# Patient Record
Sex: FEMALE | Race: WHITE | Employment: OTHER | RURAL
[De-identification: names, ages, dates, MRNs, and addresses within clinical notes are randomized per-mention and may not be internally consistent; named-entity substitution may affect disease eponyms.]

---

## 2017-05-10 RX ORDER — AZELASTINE 1 MG/ML
1 SPRAY, METERED NASAL 2 TIMES DAILY
Qty: 1 BOTTLE | Refills: 3 | Status: SHIPPED | OUTPATIENT
Start: 2017-05-10 | End: 2018-11-12 | Stop reason: CLARIF

## 2017-08-25 ENCOUNTER — TELEPHONE (OUTPATIENT)
Dept: FAMILY MEDICINE CLINIC | Age: 72
End: 2017-08-25

## 2017-11-28 ENCOUNTER — TELEPHONE (OUTPATIENT)
Dept: FAMILY MEDICINE CLINIC | Age: 72
End: 2017-11-28

## 2017-11-30 ENCOUNTER — TELEPHONE (OUTPATIENT)
Dept: FAMILY MEDICINE CLINIC | Age: 72
End: 2017-11-30

## 2017-12-28 ENCOUNTER — OFFICE VISIT (OUTPATIENT)
Dept: FAMILY MEDICINE CLINIC | Age: 72
End: 2017-12-28

## 2017-12-28 VITALS
BODY MASS INDEX: 51.24 KG/M2 | HEART RATE: 109 BPM | RESPIRATION RATE: 14 BRPM | WEIGHT: 261 LBS | TEMPERATURE: 98.1 F | DIASTOLIC BLOOD PRESSURE: 76 MMHG | OXYGEN SATURATION: 97 % | SYSTOLIC BLOOD PRESSURE: 124 MMHG | HEIGHT: 60 IN

## 2017-12-28 DIAGNOSIS — M35.3 POLYMYALGIA RHEUMATICA (HCC): Primary | ICD-10-CM

## 2017-12-28 DIAGNOSIS — I10 ESSENTIAL HYPERTENSION: ICD-10-CM

## 2017-12-28 DIAGNOSIS — Z00.00 MEDICARE ANNUAL WELLNESS VISIT, SUBSEQUENT: ICD-10-CM

## 2017-12-28 RX ORDER — LISINOPRIL 10 MG/1
10 TABLET ORAL DAILY
Qty: 90 TAB | Refills: 3 | Status: SHIPPED | OUTPATIENT
Start: 2017-12-28 | End: 2019-01-14 | Stop reason: SDUPTHER

## 2017-12-28 NOTE — MR AVS SNAPSHOT
Visit Information Date & Time Provider Department Dept. Phone Encounter #  
 12/28/2017 11:30 AM Elvira Barney MD [facility] [id number] Follow-up Instructions Return in about 1 year (around 12/28/2018). Follow-up and Disposition History Upcoming Health Maintenance Date Due Hepatitis C Screening 1945 FOBT Q 1 YEAR AGE 50-75 2/15/1995 Pneumococcal 65+ Low/Medium Risk (2 of 2 - PPSV23) 2/15/2010 MEDICARE YEARLY EXAM 11/24/2017 GLAUCOMA SCREENING Q2Y 7/6/2018 DTaP/Tdap/Td series (2 - Td) 12/28/2027 Allergies as of 12/28/2017  Review Complete On: 12/28/2017 By: Elvira Barney MD  
  
 Severity Noted Reaction Type Reactions Latex  11/05/2015    Contact Dermatitis Sulfa (Sulfonamide Antibiotics) High 11/20/2015    Anaphylaxis Duloxetine  11/23/2016   Intolerance Other (comments) Paraben  11/05/2015    Rash Soy  04/13/2016    Other (comments) Current Immunizations  Never Reviewed No immunizations on file. Not reviewed this visit You Were Diagnosed With   
  
 Codes Comments Polymyalgia rheumatica (HCC)    -  Primary ICD-10-CM: M35.3 ICD-9-CM: 476 Essential hypertension     ICD-10-CM: I10 
ICD-9-CM: 401.9 Medicare annual wellness visit, subsequent     ICD-10-CM: Z00.00 ICD-9-CM: V70.0 BMI 50.0-59.9, adult [facility])     ICD-10-CM: A45.59 
ICD-9-CM: V85.43 Vitals BP Pulse Temp Resp Height(growth percentile) Weight(growth percentile) 124/76 (!) 109 98.1 °F (36.7 °C) (Oral) 14 5' (1.524 m) 261 lb (118.4 kg) SpO2 BMI OB Status Smoking Status 97% 50.97 kg/m2 Postmenopausal Never Smoker BMI and BSA Data Body Mass Index Body Surface Area 50.97 kg/m 2 2.24 m 2 Preferred Pharmacy Pharmacy Name Phone [address] [place], [facility] [place] Paddy Macias [phone] Your Updated Medication List  
  
   
 This list is accurate as of: 12/28/17  1:42 PM.  Always use your most recent med list. ADVIL 200 mg tablet Generic drug:  ibuprofen Take  by mouth. ADVIL -38 mg Tab Generic drug:  Ibuprofen-diphenhydrAMINE Take  by mouth. alendronate 40 mg tablet Commonly known as:  FOSAMAX Take 1 Tab by mouth every seven (7) days. aspirin delayed-release 81 mg tablet Take  by mouth two (2) times a day. azelastine 137 mcg (0.1 %) nasal spray Commonly known as:  ASTELIN  
1 Spray by Both Nostrils route two (2) times a day. Use in each nostril as directed  
  
 cholecalciferol 400 unit Chew chewable tablet Commonly known as:  VITAMIN D3 Take 200 Units by mouth daily. coenzyme Q-10 200 mg capsule Commonly known as:  CO Q-10 Take  by mouth daily. cyanocobalamin (vitamin B-12) 1,000 mcg/mL Drop  
by SubLINGual route. diclofenac EC 50 mg EC tablet Commonly known as:  VOLTAREN Take 50 mg by mouth daily. lisinopril 10 mg tablet Commonly known as:  Aundra Anne Take 1 Tab by mouth daily. OSTEO BI-FLEX PO Take  by mouth. PRESERVISION AREDS Cap capsule Generic drug:  Vit A,C,E-Zinc-Copper Take 1 Cap by mouth. Prescriptions Sent to Pharmacy Refills  
 lisinopril (PRINIVIL, ZESTRIL) 10 mg tablet 3 Sig: Take 1 Tab by mouth daily. Class: Normal  
 Pharmacy: [address] [place], [address] [facility] Ph #: [phone] Route: Oral  
  
We Performed the Following CBC WITH AUTOMATED DIFF [83644 CPT(R)] LIPID PANEL [80288 CPT(R)] METABOLIC PANEL, COMPREHENSIVE [00230 CPT(R)] SED RATE (ESR) [id number] CPT(R)] TSH 3RD GENERATION [30196 CPT(R)] Follow-up Instructions Return in about 1 year (around 12/28/2018). Introducing [facility]!    
 [employer] [facility] introduces [facility] [employer] patient portal. Now you can access parts of your medical record, email your doctor's office, and request medication refills online. 1. In your internet browser, go to [email or web address] [employer] 2. Click on the First Time User? Click Here link in the Sign In box. You will see the New Member Sign Up page. 3. Enter your [employer] Access Code exactly as it appears below. You will not need to use this code after youve completed the sign-up process. If you do not sign up before the expiration date, you must request a new code. · [employer] Access Code: [email or web address] Expires: 3/28/2018 12:03 PM 
 
4. Enter the last four digits of your Social Security Number (xxxx) and Date of Birth (mm/dd/yyyy) as indicated and click Submit. You will be taken to the next sign-up page. 5. Create a [employer] ID. This will be your [employer] login ID and cannot be changed, so think of one that is secure and easy to remember. 6. Create a [employer] password. You can change your password at any time. 7. Enter your Password Reset Question and Answer. This can be used at a later time if you forget your password. 8. Enter your e-mail address. You will receive e-mail notification when new information is available in [address] 9. Click Sign Up. You can now view and download portions of your medical record. 10. Click the Download Summary menu link to download a portable copy of your medical information. If you have questions, please visit the Frequently Asked Questions section of the [employer] website. Remember, [employer] is NOT to be used for urgent needs. For medical emergencies, dial 911. Now available from your iPhone and Android! Please provide this summary of care documentation to your next provider. Your primary care clinician is listed as Vijay Serrato. If you have any questions after today's visit, please call [phone].

## 2017-12-28 NOTE — PROGRESS NOTES
Chief Complaint   Patient presents with    Annual Wellness Visit    Advance Care Planning     need         HPI:       is a 67 y.o. female. Ms Glenda Cardenas suffers from morbid obesity. Her OA is significant and complicated by the presence of PMR (manages with NSAIDs). She has been evaluated by Dr Ang Hatch for joint replacement surgery. She has HTN. Compliant, the patient reports no problems or side effects from current medications. Retired and on Disability as a result of her PMR, she remains independent. Allergies   Allergen Reactions    Latex Contact Dermatitis    Sulfa (Sulfonamide Antibiotics) Anaphylaxis    Duloxetine Other (comments)    Paraben Rash    Soy Other (comments)       Current Outpatient Prescriptions   Medication Sig    Vit A,C,E-Zinc-Copper (PRESERVISION AREDS) cap capsule Take 1 Cap by mouth.  lisinopril (PRINIVIL, ZESTRIL) 10 mg tablet Take 1 Tab by mouth daily.  azelastine (ASTELIN) 137 mcg (0.1 %) nasal spray 1 [place] by Both Nostrils route two (2) times a day. Use in each nostril as directed    diclofenac EC (VOLTAREN) 50 mg EC tablet Take 50 mg by mouth daily.  alendronate (FOSAMAX) 40 mg tablet Take 1 Tab by mouth every seven (7) days.  cholecalciferol (VITAMIN D3) 400 unit chew chewable tablet Take 200 Units by mouth daily.  coenzyme Q-10 (CO Q-10) 200 mg capsule Take  by mouth daily.  cyanocobalamin, vitamin B-12, 1,000 mcg/mL drop by SubLINGual route.  GLUCOSAMINE HCL/CHONDR WALKER A NA (OSTEO BI-FLEX PO) Take  by mouth.  aspirin delayed-release 81 mg tablet Take  by mouth two (2) times a day.  ibuprofen (ADVIL) 200 mg tablet Take  by mouth.  Ibuprofen-diphenhydrAMINE (ADVIL PM) 200-38 mg tab Take  by mouth. No current facility-administered medications for this visit.         Past Medical History:   Diagnosis Date    Asthma     Hyperlipidemia     Hypertension     Morbid obesity ([address]     Pneumonia age 3    again in her 30's    Polymyalgia rheumatica (HCC)     Vitamin D deficiency          ROS:  Denies fever, chills, cough, chest pain, SOB,  nausea, vomiting, or diarrhea. Denies wt loss, wt gain, hemoptysis, hematochezia or melena. Physical Examination:    /76 Comment: large cuff  Pulse (!) 109  Temp 98.1 °F (36.7 °C) (Oral)   Resp 14  Ht 5' (1.524 m)  Wt 261 lb (118.4 kg)  SpO2 97%  BMI 50.97 kg/m2    General: Alert and Ox3, Fluent speech  HEENT:  NC/AT, EOMI, OP: clear  Neck:  Supple, no adenopathy, JVD, mass or bruit  Chest:  Clear to Ausculation, without wheezes, rales, rubs or ronchi  Cardiac: RRR  Abdomen:  +BS, soft, nontender without palpable HSM  Extremities:  No cyanosis, clubbing or edema  Neurologic:  Ambulatory without assist, CN 2-12 grossly intact. Moves all extremities. Skin: no rash  Lymphadenopathy: no cervical or supraclavicular nodes      ASSESSMENT AND PLAN:     1. BMI 50:  Diet and exercise stressed  2. HTN is well controlled  3. SAWV  4. PMR: review labs this week  5. Osteoporosis:  Complete 5 years of Fosamax    Orders Placed This Encounter    SED RATE (ESR)    TSH 3RD GENERATION    LIPID PANEL    METABOLIC PANEL, COMPREHENSIVE    CBC WITH AUTOMATED DIFF    Vit A,C,E-Zinc-Copper (PRESERVISION AREDS) cap capsule     Sig: Take 1 Cap by mouth.  lisinopril (PRINIVIL, ZESTRIL) 10 mg tablet     Sig: Take 1 Tab by mouth daily. Dispense:  90 Tab     Refill:  3       Flavio Dietrich MD, FACP        ______________________________________________________________________    Sarah Rocha is a 67 y.o. female and presents for annual Medicare Wellness Visit. Problem List: Reviewed with patient and discussed risk factors.     Patient Active Problem List   Diagnosis Code    Hyperlipidemia E78.5    Morbid obesity ([address] E66.01    Polymyalgia rheumatica (HCC) M35.3    Hypertension I10    Asthma J45.909    Vitamin D deficiency E55.9    Pneumonia J18.9    Advanced care planning/counseling discussion Z70.80    Medicare annual wellness visit, subsequent Z00.00       Current medical providers:  Patient Care Team:  Hayder Waldron MD as PCP - General (Internal Medicine)    PMH, SH, Medications/Allergies: reviewed, on chart. Female Alcohol Screening: On any occasion during the past 3 months, have you had more than 3 drinks containing alcohol? No    Do you average more than 7 drinks per week? No    ROS:  Constitutional: No fever, chills or weight loss  Respiratory: No cough, SOB   CV: No chest pain or Palpitations    Objective:  Visit Vitals    /76  Comment: large cuff    Pulse (!) 109    Temp 98.1 °F (36.7 °C) (Oral)    Resp 14    Ht 5' (1.524 m)    Wt 261 lb (118.4 kg)    SpO2 97%    BMI 50.97 kg/m2    Body mass index is 50.97 kg/(m^2). Assessment of cognitive impairment: Alert and oriented x 3    Depression Screen:   PHQ over the last two weeks 12/28/2017   Little interest or pleasure in doing things Not at all   Feeling down, depressed or hopeless Not at all   Total Score PHQ 2 0       Fall Risk Assessment:    Fall Risk Assessment, last 12 mths 12/28/2017   Able to walk? Yes   Fall in past 12 months? No       Functional Ability:   Does the patient exhibit a steady gait? yes   How long did it take the patient to get up and walk from a sitting position? 12 sec   Is the patient self reliant?  (ie can do own laundry, meals, household chores)  yes     Does the patient handle his/her own medications? yes     Does the patient handle his/her own money? yes     Is the patients home safe (ie good lighting, handrails on stairs and bath, etc.)? yes     Did you notice or did patient express any hearing difficulties? yes     Did you notice or did patient express any vision difficulties?    no       Advance Care Planning:   Patient was offered the opportunity to discuss advance care planning:  yes     Does patient have an Advance Directive:  yes   If no, did you provide information on Caring Connections?  no       Plan:      Orders Placed This Encounter    SED RATE (ESR)    TSH 3RD GENERATION    LIPID PANEL    METABOLIC PANEL, COMPREHENSIVE    CBC WITH AUTOMATED DIFF    Vit A,C,E-Zinc-Copper (PRESERVISION AREDS) cap capsule    lisinopril (PRINIVIL, ZESTRIL) 10 mg tablet       Health Maintenance   Topic Date Due    Hepatitis C Screening  1945    FOBT Q 1 YEAR AGE 50-75  02/15/1995    Pneumococcal 65+ Low/Medium Risk (2 of 2 - PPSV23) 02/15/2010    MEDICARE YEARLY EXAM  11/24/2017    GLAUCOMA SCREENING Q2Y  07/06/2018    DTaP/Tdap/Td series (2 - Td) 12/28/2027    OSTEOPOROSIS SCREENING (DEXA)  Completed    ZOSTER VACCINE AGE 60>  Addressed    Influenza Age 5 to Adult  Addressed       *Patient verbalized understanding and agreement with the plan. A copy of the After Visit Summary with personalized health plan was given to the patient today.

## 2017-12-28 NOTE — PROGRESS NOTES
Calros Ayoub is a 67 y.o. female and presents for annual Medicare Wellness Visit. Problem List: Reviewed with patient and discussed risk factors. Patient Active Problem List   Diagnosis Code    Hyperlipidemia E78.5    Morbid obesity ([address] E66.01    Polymyalgia rheumatica (HCC) M35.3    Hypertension I10    Asthma J45.909    Vitamin D deficiency E55.9    Pneumonia J18.9    Advanced care planning/counseling discussion Z70.80       Current medical providers:  Patient Care Team:  Lia Ceja MD as PCP - General (Internal Medicine)    PSH: Reviewed with patient  Past Surgical History:   Procedure Laterality Date    HX COLONOSCOPY  2005        SH: Reviewed with patient  Social History   Substance Use Topics    Smoking status: Never Smoker    Smokeless tobacco: Never Used    Alcohol use None       FH: Reviewed with patient  No family history on file. Medications/Allergies: Reviewed with patient  Current Outpatient Prescriptions on File Prior to Visit   Medication Sig Dispense Refill    azelastine (ASTELIN) 137 mcg (0.1 %) nasal spray 1 [place] by Both Nostrils route two (2) times a day. Use in each nostril as directed 1 Bottle 3    diclofenac EC (VOLTAREN) 50 mg EC tablet Take 50 mg by mouth daily.  alendronate (FOSAMAX) 40 mg tablet Take 1 Tab by mouth every seven (7) days. 12 Tab 4    lisinopril (PRINIVIL, ZESTRIL) 10 mg tablet Take 1 Tab by mouth daily. 90 Tab 3    cholecalciferol (VITAMIN D3) 400 unit chew chewable tablet Take 200 Units by mouth daily.  coenzyme Q-10 (CO Q-10) 200 mg capsule Take  by mouth daily.  cyanocobalamin, vitamin B-12, 1,000 mcg/mL drop by SubLINGual route.  GLUCOSAMINE HCL/CHONDR WALKER A NA (OSTEO BI-FLEX PO) Take  by mouth.  aspirin delayed-release 81 mg tablet Take  by mouth two (2) times a day.  ibuprofen (ADVIL) 200 mg tablet Take  by mouth.  Ibuprofen-diphenhydrAMINE (ADVIL PM) 200-38 mg tab Take  by mouth.        No current facility-administered medications on file prior to visit. Allergies   Allergen Reactions    Latex Contact Dermatitis    Sulfa (Sulfonamide Antibiotics) Anaphylaxis    Duloxetine Other (comments)    Paraben Rash    Soy Other (comments)       Objective:  Visit Vitals    /76  Comment: large cuff    Pulse (!) 109    Temp 98.1 °F (36.7 °C) (Oral)    Resp 14    Ht 5' (1.524 m)    Wt 261 lb (118.4 kg)    SpO2 97%    BMI 50.97 kg/m2    Body mass index is 50.97 kg/(m^2). Assessment of cognitive impairment: Alert and oriented x 3    Depression Screen:   PHQ over the last two weeks 12/28/2017   Little interest or pleasure in doing things Not at all   Feeling down, depressed or hopeless Not at all   Total Score PHQ 2 0       Fall Risk Assessment:    Fall Risk Assessment, last 12 mths 12/28/2017   Able to walk? Yes   Fall in past 12 months? No       Functional Ability:   Does the patient exhibit a steady gait? yes   How long did it take the patient to get up and walk from a sitting position? 1   Is the patient self reliant?  (ie can do own laundry, meals, household chores)    yes     Does the patient handle his/her own medications? yes     Does the patient handle his/her own money? yes     Is the patients home safe (ie good lighting, handrails on stairs and bath, etc.)? no     Did you notice or did patient express any hearing difficulties? yes     Did you notice or did patient express any vision difficulties? yes     Were distance and reading eye charts used? no       Advance Care Planning:   Patient was offered the opportunity to discuss advance care planning:  yes     Does patient have an Advance Directive:  yes   If no, did you provide information on Caring Connections?   yes       Plan:      Orders Placed This Encounter    Vit A,C,E-Zinc-Copper (PRESERVISION AREDS) cap capsule       Health Maintenance   Topic Date Due    Hepatitis C Screening  1945    DTaP/Tdap/Td series (1 - Tdap) 02/15/1966    FOBT Q 1 YEAR AGE 50-75  02/15/1995    ZOSTER VACCINE AGE 60>  12/15/2004    Pneumococcal 65+ Low/Medium Risk (1 of 2 - PCV13) 02/15/2010    Influenza Age 9 to Adult  08/01/2017    MEDICARE YEARLY EXAM  11/24/2017    GLAUCOMA SCREENING Q2Y  07/06/2018    OSTEOPOROSIS SCREENING (DEXA)  Completed       *Patient verbalized understanding and agreement with the plan. A copy of the After Visit Summary with personalized health plan was given to the patient today.

## 2017-12-28 NOTE — ACP (ADVANCE CARE PLANNING)
In the event that she is unable to speak for herself, please contact her nephew, Elba Fontanez who can be reached at [phone]. Ed's wife, Billy Kim, can be reached at [phone].     Everton Montoya

## 2017-12-29 LAB
ALBUMIN SERPL-MCNC: 4.4 G/DL (ref 3.5–4.8)
ALBUMIN/GLOB SERPL: 1.6 {RATIO} (ref 1.2–2.2)
ALP SERPL-CCNC: 72 IU/L (ref 39–117)
ALT SERPL-CCNC: 13 IU/L (ref 0–32)
AST SERPL-CCNC: 12 IU/L (ref 0–40)
BASOPHILS # BLD AUTO: 0.1 X10E3/UL (ref 0–0.2)
BASOPHILS NFR BLD AUTO: 1 %
BILIRUB SERPL-MCNC: 0.3 MG/DL (ref 0–1.2)
BUN SERPL-MCNC: 16 MG/DL (ref 8–27)
BUN/CREAT SERPL: 28 (ref 12–28)
CALCIUM SERPL-MCNC: 10.4 MG/DL (ref 8.7–10.3)
CHLORIDE SERPL-SCNC: 99 MMOL/L (ref 96–106)
CHOLEST SERPL-MCNC: 299 MG/DL (ref 100–199)
CO2 SERPL-SCNC: 21 MMOL/L (ref 18–29)
CREAT SERPL-MCNC: 0.57 MG/DL (ref 0.57–1)
EOSINOPHIL # BLD AUTO: 0.3 X10E3/UL (ref 0–0.4)
EOSINOPHIL NFR BLD AUTO: 3 %
ERYTHROCYTE [DISTWIDTH] IN BLOOD BY AUTOMATED COUNT: 15.2 % (ref 12.3–15.4)
ERYTHROCYTE [SEDIMENTATION RATE] IN BLOOD BY WESTERGREN METHOD: 31 MM/HR (ref 0–40)
GLOBULIN SER CALC-MCNC: 2.8 G/DL (ref 1.5–4.5)
GLUCOSE SERPL-MCNC: 100 MG/DL (ref 65–99)
HCT VFR BLD AUTO: 44.2 % (ref 34–46.6)
HDLC SERPL-MCNC: 68 MG/DL
HGB BLD-MCNC: 15.3 G/DL (ref 11.1–15.9)
IMM GRANULOCYTES # BLD: 0 X10E3/UL (ref 0–0.1)
IMM GRANULOCYTES NFR BLD: 0 %
LDLC SERPL CALC-MCNC: 203 MG/DL (ref 0–99)
LYMPHOCYTES # BLD AUTO: 2.4 X10E3/UL (ref 0.7–3.1)
LYMPHOCYTES NFR BLD AUTO: 21 %
MCH RBC QN AUTO: 30.1 PG (ref 26.6–33)
MCHC RBC AUTO-ENTMCNC: 34.6 G/DL (ref 31.5–35.7)
MCV RBC AUTO: 87 FL (ref 79–97)
MONOCYTES # BLD AUTO: 0.8 X10E3/UL (ref 0.1–0.9)
MONOCYTES NFR BLD AUTO: 7 %
NEUTROPHILS # BLD AUTO: 7.7 X10E3/UL (ref 1.4–7)
NEUTROPHILS NFR BLD AUTO: 68 %
PLATELET # BLD AUTO: 369 X10E3/UL (ref 150–379)
POTASSIUM SERPL-SCNC: 4.9 MMOL/L (ref 3.5–5.2)
PROT SERPL-MCNC: 7.2 G/DL (ref 6–8.5)
RBC # BLD AUTO: 5.09 X10E6/UL (ref 3.77–5.28)
SODIUM SERPL-SCNC: 139 MMOL/L (ref 134–144)
TRIGL SERPL-MCNC: 138 MG/DL (ref 0–149)
TSH SERPL DL<=0.005 MIU/L-ACNC: 1.82 UIU/ML (ref 0.45–4.5)
VLDLC SERPL CALC-MCNC: 28 MG/DL (ref 5–40)
WBC # BLD AUTO: 11.2 X10E3/UL (ref 3.4–10.8)

## 2018-07-02 ENCOUNTER — OFFICE VISIT (OUTPATIENT)
Dept: FAMILY MEDICINE CLINIC | Age: 73
End: 2018-07-02

## 2018-07-02 VITALS
SYSTOLIC BLOOD PRESSURE: 132 MMHG | HEART RATE: 102 BPM | OXYGEN SATURATION: 95 % | BODY MASS INDEX: 50.65 KG/M2 | WEIGHT: 258 LBS | RESPIRATION RATE: 18 BRPM | DIASTOLIC BLOOD PRESSURE: 74 MMHG | HEIGHT: 60 IN

## 2018-07-02 DIAGNOSIS — Z74.09 IMPAIRED MOBILITY: Primary | ICD-10-CM

## 2018-07-02 DIAGNOSIS — M35.3 POLYMYALGIA RHEUMATICA (HCC): ICD-10-CM

## 2018-07-02 NOTE — PROGRESS NOTES
Chief Complaint   Patient presents with    Irritable Bowel Syndrome         HPI:       is a 68 y.o. female. Ms Ariella Mckenzie suffers from morbid obesity. Mine [place] OA is significant and complicated by the presence of PMR (manages with NSAIDs).  She has been evaluated by Dr Cammie Obando for joint replacement surgery.      She has HTN.  Compliant, the patient reports no problems or side effects from current medications.       Retired and on Disability as a result of her PMR, she remains independent. New Issues:  Interested in a mobility device and CBD oil    Allergies   Allergen Reactions    Latex Contact Dermatitis    Sulfa (Sulfonamide Antibiotics) Anaphylaxis    Duloxetine Other (comments)    Other Plant, Animal, Environmental Other (comments)     Metals    Paraben Rash    Soy Other (comments)       Current Outpatient Prescriptions   Medication Sig    loperamide HCl (IMODIUM A-D PO) Take 1 Tab by mouth daily.  Vit A,C,E-Zinc-Copper (PRESERVISION AREDS) cap capsule Take 1 Cap by mouth.  lisinopril (PRINIVIL, ZESTRIL) 10 mg tablet Take 1 Tab by mouth daily.  azelastine (ASTELIN) 137 mcg (0.1 %) nasal spray 1 [place] by Both Nostrils route two (2) times a day. Use in each nostril as directed    cholecalciferol (VITAMIN D3) 400 unit chew chewable tablet Take 200 Units by mouth daily.  coenzyme Q-10 (CO Q-10) 200 mg capsule Take  by mouth daily.  cyanocobalamin, vitamin B-12, 1,000 mcg/mL drop by SubLINGual route.  aspirin delayed-release 81 mg tablet Take  by mouth two (2) times a day.  ibuprofen (ADVIL) 200 mg tablet Take  by mouth.  Ibuprofen-diphenhydrAMINE (ADVIL PM) 200-38 mg tab Take  by mouth. No current facility-administered medications for this visit.         Past Medical History:   Diagnosis Date    Asthma     Hyperlipidemia     Hypertension     [address]bid [address] ([facility] [address]     Pneumonia age 3    again in her 29's   [address] [facility] [address] Polymyalgia rheumatica ([facility]     Vitamin D deficiency ROS:  Denies fever, chills, cough, chest pain, SOB,  nausea, vomiting, or diarrhea. Denies wt loss, wt gain, hemoptysis, hematochezia or melena. Physical Examination:    /74 (BP 1 Location: Left arm, BP Patient Position: Sitting)  Pulse (!) 102  Resp 18  Ht 5' (1.524 m)  Wt 258 lb (117 kg)  SpO2 95%  BMI 50.39 kg/m2    General: Alert and Ox3, Fluent speech  HEENT:  NC/AT, EOMI, OP: clear  Neck:  Supple, no adenopathy, JVD, mass or bruit  Chest:  Clear to Ausculation, without wheezes, rales, rubs or ronchi  Cardiac: RRR  Abdomen:  +BS, soft, nontender without palpable HSM  Extremities:  No cyanosis, clubbing or edema  Neurologic:  Ambulatory with walker, CN 2-12 grossly intact. Moves all extremities. Skin: no rash  Lymphadenopathy: no cervical or supraclavicular nodes      ASSESSMENT AND PLAN:     1. Impaired mobility:  OT eval  2. PMR:  Discussion regarding CBD oil  3. She will RTC in November 4. BMI 50:  education    Orders Placed This Encounter    REFERRAL TO OCCUPATIONAL MEDICINE     Referral Priority:   Routine     Referral Type:   Consultation     Referral Reason:   Specialty Services Required     Referral Location:   [facility]    loperamide HCl (IMODIUM A-D PO)     Sig: Take 1 Tab by mouth daily.        Flavio Dietrich MD, [address]

## 2018-07-02 NOTE — MR AVS SNAPSHOT
[address] [place] [address] [place] [address] [employer] [employer] [address] 
[phone] Patient: Sanya Munoz MRN: [id number] SAF:[birth year] Visit Information Date & Time Provider Department Dept. Phone Encounter #  
 7/2/2018 11:30 XU Kaufamn MD Steph[facility] 72 [phone] [id number] Upcoming Health Maintenance Date Due Hepatitis C Screening 1945 FOBT Q 1 YEAR AGE 50-75 2/15/1995 Pneumococcal 65+ Low/Medium Risk (2 of 2 - PPSV23) 2/15/2010 GLAUCOMA SCREENING Q2Y 7/6/2018 Influenza Age 5 to Adult 8/1/2018 MEDICARE YEARLY EXAM 12/29/2018 BREAST CANCER SCRN MAMMOGRAM 4/21/2019 DTaP/Tdap/Td series (2 - Td) 12/28/2027 Allergies as of 7/2/2018  Review Complete On: 7/2/2018 By: Irving Kaufman MD  
  
 Severity Noted Reaction Type Reactions Latex  11/05/2015    Contact Dermatitis Sulfa (Sulfonamide Antibiotics) High 11/20/2015    Anaphylaxis Duloxetine  11/23/2016   Intolerance Other (comments) Other Plant, Animal, Environmental  07/02/2018    Other (comments) Metals Paraben  11/05/2015    Rash Soy  04/13/2016    Other (comments) Current Immunizations  Never Reviewed No immunizations on file. Not reviewed this visit You Were Diagnosed With   
  
 Codes Comments Impaired mobility    -  Primary ICD-10-CM: Z74.09 
ICD-9-CM: 799.89 BMI 50.0-59.9, adult [facility])     ICD-10-CM: G40.87 
ICD-9-CM: V85.43 Polymyalgia rheumatica (HCC)     ICD-10-CM: M35.3 ICD-9-CM: 729 Vitals BP Pulse Resp Height(growth percentile) Weight(growth percentile) SpO2  
 132/74 (BP 1 Location: Left arm, BP Patient Position: Sitting) (!) 102 18 5' (1.524 m) 258 lb (117 kg) 95% BMI OB Status Smoking Status 50.39 kg/m2 Postmenopausal Never Smoker BMI and BSA Data Body Mass Index Body Surface Area  
 50.39 kg/m 2 2.23 m 2 Preferred Pharmacy Pharmacy Name Phone [address] [place], [address] [phone] Your Updated Medication List  
  
   
This list is accurate as of 7/2/18 12:22 PM.  Always use your most recent med list. ADVIL 200 mg tablet Generic drug:  ibuprofen Take  by mouth. ADVIL -38 mg Tab Generic drug:  Ibuprofen-diphenhydrAMINE Take  by mouth. aspirin delayed-release 81 mg tablet Take  by mouth two (2) times a day. azelastine 137 mcg (0.1 %) nasal spray Commonly known as:  ASTELIN  
1 Spray by Both Nostrils route two (2) times a day. Use in each nostril as directed  
  
 cholecalciferol 400 unit Chew chewable tablet Commonly known as:  VITAMIN D3 Take 200 Units by mouth daily. coenzyme Q-10 200 mg capsule Commonly known as:  CO Q-10 Take  by mouth daily. cyanocobalamin (vitamin B-12) 1,000 mcg/mL Drop  
by SubLINGual route. IMODIUM A-D PO Take 1 Tab by mouth daily. lisinopril 10 mg tablet Commonly known as:  Chayo Joon Take 1 Tab by mouth daily. PRESERVISION AREDS Cap capsule Generic drug:  Vit A,C,E-Zinc-Copper Take 1 Cap by mouth. We Performed the Following REFERRAL TO OCCUPATIONAL MEDICINE [MH96 Custom] Comments:  
 Please evaluate for mobility options Referral Information Referral ID Referred By Referred To  
  
 [id number] [facility] [address] [facility]    
   [facility], [address] Phone: [phone] Fax: [phone] Visits Status Start Date End Date 1 New Request 7/2/18 7/2/19 If your referral has a status of pending review or denied, additional information will be sent to support the outcome of this decision. Introducing [facility] & HEALTH SERVICES!    
 Uma Barboza introduces [employer] patient portal. Now you can access parts of your medical record, email your doctor's office, and request medication refills online. 1. In your internet browser, go to [email or web address] 2. Click on the First Time User? Click Here link in the Sign In box. You will see the New Member Sign Up page. 3. Enter your Navera Access Code exactly as it appears below. You will not need to use this code after youve completed the sign-up process. If you do not sign up before the expiration date, you must request a new code. · [employer] Access Code: [email or web address] Expires: 9/30/2018 12:22 PM 
 
4. Enter the last four digits of your Social Security Number (xxxx) and Date of Birth (mm/dd/yyyy) as indicated and click Submit. You will be taken to the next sign-up page. 5. Create a Navera ID. This will be your Navera login ID and cannot be changed, so think of one that is secure and easy to remember. 6. Create a Navera password. You can change your password at any time. 7. Enter your Password Reset Question and Answer. This can be used at a later time if you forget your password. 8. Enter your e-mail address. You will receive e-mail notification when new information is available in [address] 9. Click Sign Up. You can now view and download portions of your medical record. 10. Click the Download Summary menu link to download a portable copy of your medical information. If you have questions, please visit the Frequently Asked Questions section of the Navera website. Remember, [employer]a is NOT to be used for urgent needs. For medical emergencies, dial 911. Now available from your iPhone and Android! Please provide this summary of care documentation to your next provider. Your primary care clinician is listed as Gino Beasley. If you have any questions after today's visit, please call [phone].

## 2018-07-02 NOTE — PROGRESS NOTES
1. Have you been to the ER, urgent care clinic since your last visit? Hospitalized since your last visit? No    2. Have you seen or consulted any other health care providers outside of the [address] [place] [address] since your last visit? Include any pap smears or colon screening.  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

## 2018-07-23 ENCOUNTER — CLINICAL SUPPORT (OUTPATIENT)
Dept: FAMILY MEDICINE CLINIC | Age: 73
End: 2018-07-23

## 2018-07-23 DIAGNOSIS — Z12.11 COLON CANCER SCREENING: Primary | ICD-10-CM

## 2018-07-23 NOTE — MR AVS SNAPSHOT
[place] [address] [place] [address] 
[phone] Patient: Shantanu Beaulieu MRN: [id number] EHJ:[birth year] Visit Information Date & Time Provider Department Dept. Phone Encounter #  
 7/23/2018  3:00 PM [facility] [phone] [id number] Upcoming Health Maintenance Date Due Hepatitis C Screening 1945 FOBT Q 1 YEAR AGE 50-75 2/15/1995 Pneumococcal 65+ Low/Medium Risk (2 of 2 - PPSV23) 2/15/2010 GLAUCOMA SCREENING Q2Y 7/6/2018 Influenza Age 5 to Adult 8/1/2018 MEDICARE YEARLY EXAM 12/29/2018 BREAST CANCER SCRN MAMMOGRAM 4/21/2019 DTaP/Tdap/Td series (2 - Td) 12/28/2027 Allergies as of 7/23/2018  Review Complete On: 7/2/2018 By: Yee Rodríguez MD  
  
 Severity Noted Reaction Type Reactions Latex  11/05/2015    Contact Dermatitis Sulfa (Sulfonamide Antibiotics) High 11/20/2015    Anaphylaxis Duloxetine  11/23/2016   Intolerance Other (comments) Other Plant, Animal, Environmental  07/02/2018    Other (comments) Metals Paraben  11/05/2015    Rash Soy  04/13/2016    Other (comments) Current Immunizations  Never Reviewed No immunizations on file. Not reviewed this visit You Were Diagnosed With   
  
 Codes Comments Colon cancer screening    -  Primary ICD-10-CM: Z12.11 ICD-9-CM: V76.51 Vitals OB Status Smoking Status Postmenopausal Never Smoker Preferred Pharmacy Pharmacy Name Phone [address] [phone] Your Updated Medication List  
  
   
This list is accurate as of 7/23/18  3:10 PM.  Always use your most recent med list. ADVIL 200 mg tablet Generic drug:  ibuprofen Take  by mouth. ADVIL -38 mg Tab Generic drug:  Ibuprofen-diphenhydrAMINE Take  by mouth. aspirin delayed-release 81 mg tablet Take  by mouth two (2) times a day. azelastine 137 mcg (0.1 %) nasal spray Commonly known as:  ASTELIN  
1 Spray by Both Nostrils route two (2) times a day. Use in each nostril as directed  
  
 cholecalciferol 400 unit Chew chewable tablet Commonly known as:  VITAMIN D3 Take 200 Units by mouth daily. coenzyme Q-10 200 mg capsule Commonly known as:  CO Q-10 Take  by mouth daily. cyanocobalamin (vitamin B-12) 1,000 mcg/mL Drop  
by SubLINGual route. IMODIUM A-D PO Take 1 Tab by mouth daily. lisinopril 10 mg tablet Commonly known as:  Georgiana Christopher Take 1 Tab by mouth daily. PRESERVISION AREDS Cap capsule Generic drug:  Vit A,C,E-Zinc-Copper Take 1 Cap by mouth. We Performed the Following OCCULT BLOOD, IMMUNOASSAY (FIT) [id number] CPT(R)] Introducing [facility] & HEALTH SERVICES! Tea Tucker introduces [employer] patient portal. Now you can access parts of your medical record, email your doctor's office, and request medication refills online. 1. In your internet browser, go to [email or web address] 2. Click on the First Time User? Click Here link in the Sign In box. You will see the New Member Sign Up page. 3. Enter your [employer] Access Code exactly as it appears below. You will not need to use this code after youve completed the sign-up process. If you do not sign up before the expiration date, you must request a new code. · [employer] Access Code: [email or web address] Expires: 9/30/2018 12:22 PM 
 
4. Enter the last four digits of your Social Security Number (xxxx) and Date of Birth (mm/dd/yyyy) as indicated and click Submit. You will be taken to the next sign-up page. 5. Create a Spark[employer] ID. This will be your [employer] login ID and cannot be changed, so think of one that is secure and easy to remember. 6. Create a [employer] password. You can change your password at any time. 7. Enter your Password Reset Question and Answer. This can be used at a later time if you forget your password. 8. Enter your e-mail address. You will receive e-mail notification when new information is available in [address] 9. Click Sign Up. You can now view and download portions of your medical record. 10. Click the Download Summary menu link to download a portable copy of your medical information. If you have questions, please visit the Frequently Asked Questions section of the [employer] website. Remember, [employer]Hits is NOT to be used for urgent needs. For medical emergencies, dial 911. Now available from your iPhone and Android! Please provide this summary of care documentation to your next provider. Your primary care clinician is listed as Kenny Donnelly. If you have any questions after today's visit, please call [phone].

## 2018-07-28 LAB — HEMOCCULT STL QL IA: NEGATIVE

## 2019-09-24 PROBLEM — Z00.00 MEDICARE ANNUAL WELLNESS VISIT, SUBSEQUENT: Status: RESOLVED | Noted: 2017-12-28 | Resolved: 2019-09-24

## 2021-06-14 RX ORDER — LISINOPRIL 10 MG/1
10 TABLET ORAL
Qty: 90 TABLET | Refills: 5 | Status: SHIPPED | OUTPATIENT
Start: 2021-06-14 | End: 2022-01-01

## 2021-06-14 NOTE — TELEPHONE ENCOUNTER
----- Message from Jeff Gamboa sent at 6/14/2021 10:28 AM EDT -----  Regarding: Dr. Larisa Hudson: [phone]  Medication Refill    Caller (if not patient):Pt      Relationship of caller (if not patient):n.a      Best contact number(s):[phone]      Name of medication and dosage if known:lisinopril 10mg      Is patient out of this medication (yes/no)yes      Pharmacy name:[facility]    Pharmacy listed in chart? (yes/no):yes  Pharmacy phone [phone]      Details to clarify the request:n/a      Jeff Gamboa

## 2022-01-01 RX ORDER — LISINOPRIL 10 MG/1
TABLET ORAL
Qty: 90 TABLET | Refills: 5 | Status: SHIPPED | OUTPATIENT
Start: 2022-01-01

## 2022-09-29 ENCOUNTER — TELEPHONE (OUTPATIENT)
Dept: FAMILY MEDICINE CLINIC | Age: 77
End: 2022-09-29

## 2022-09-29 NOTE — TELEPHONE ENCOUNTER
----- Message from Barrett Otero sent at 2022 11:19 AM EDT -----  Subject: Message to Provider    QUESTIONS  Information for Provider? Patient's niece wanted to let us know that   Shabbir Malik has passed away. She was found  in her home 3 weeks ago.   ---------------------------------------------------------------------------  --------------  Natalie Rao INFO  [phone]; OK to leave message on voicemail  ---------------------------------------------------------------------------  --------------  SCRIPT ANSWERS  Relationship to Patient? Third Party  Third Party Type? Other  Other Third Party Type? Family  Representative Name?  Vipul Velasquez